# Patient Record
(demographics unavailable — no encounter records)

---

## 2018-07-31 NOTE — P.MSEPDOC
Presenting Problems





- Arrival Data


Date of Arrival on Unit: 18


Time of Arrival on Unit: 17:18


Mode of Transport: Ambulatory





- Complaint


Comment: lower abd cramping, light headed, dizzy, episode of diarhhea





Prenatal Medical History





- Pregnancy Information


: 4


Para: 3


Term: 3


: 0


Abortions: Spontaneous or Elective: 0


Number of Living Children: 3





- Gestational Age


Gestational Age by JORDYN (wks/days): 27 Weeks and 6 Days





Review of Systems





- Review of Systems


Constitutional: No problems


Breast: No problems


ENT: No problems


Cardiovascular: No problems


Respiratory: No problems


Gastrointestinal: No problems


Genitourinary: No problems


Musculoskeletal: No problems


Neurological: No problems


Skin: No problems





Vital Signs





- Pulse


  ** Right Brachial


Pulse Rate: 105


Pulse Assessment Method: Automatic Cuff





- Respirations


Respiratory Rate: 16


Oxygen Delivery Method: Room Air


O2 Sat by Pulse Oximetry: 98





- Blood Pressure


  ** Right Arm


Blood Pressure: 107/63


Blood Pressure Mean: 77


Blood Pressure Source: Automatic Cuff





Medical Screen Scoring (Pre)





- Cervical Exam


Dilation: 0 cm = 0


Membranes: Intact





- Uterine Contractions


Frequency: N/A





- Maternal Vital Signs


Maternal Temperature: N/A


Maternal Blood Pressure: N/A


Signs of Preeclampsia: N/A


Maternal Respirations: N/A





- Fetal Assessment


Baseline FHR: 145


Fetal Heart Rate - NICHD Category: Category I (Normal) = 0


Fetal Position: N/A


Fetal Station: N/A





- Total Score


Total Score (Pre): 0





- Level of Risk


Level of Risk: Low (0-5)





Physician Notification (Pre)





- Notification Comment


Comment: Orders to d/c recieved by previous shift following infusion of IV 

hydration and pulse less than 90 bpm





Disposition





- Disposition


OB Disposition: Discharge to home


Discharge Date: 18


Discharge Time: 19:30


I agree with the RN Medical Screening Exam: Yes


Risk & Benefit of care provided described in d/c instruction: Yes


Diagnosis: PREGNANCY RELATED CONDITIONS, UNSPECIFIED, SECOND TRIMESTER